# Patient Record
Sex: MALE | Race: WHITE | NOT HISPANIC OR LATINO | Employment: FULL TIME | URBAN - METROPOLITAN AREA
[De-identification: names, ages, dates, MRNs, and addresses within clinical notes are randomized per-mention and may not be internally consistent; named-entity substitution may affect disease eponyms.]

---

## 2024-03-20 ENCOUNTER — OFFICE VISIT (OUTPATIENT)
Dept: ENDOCRINOLOGY | Facility: CLINIC | Age: 54
End: 2024-03-20
Payer: COMMERCIAL

## 2024-03-20 ENCOUNTER — DOCUMENTATION (OUTPATIENT)
Dept: ENDOCRINOLOGY | Facility: CLINIC | Age: 54
End: 2024-03-20

## 2024-03-20 VITALS
DIASTOLIC BLOOD PRESSURE: 84 MMHG | BODY MASS INDEX: 29.52 KG/M2 | OXYGEN SATURATION: 96 % | HEART RATE: 75 BPM | HEIGHT: 75 IN | SYSTOLIC BLOOD PRESSURE: 128 MMHG | WEIGHT: 237.4 LBS

## 2024-03-20 DIAGNOSIS — E11.65 TYPE 2 DIABETES MELLITUS WITH HYPERGLYCEMIA, WITHOUT LONG-TERM CURRENT USE OF INSULIN: Primary | ICD-10-CM

## 2024-03-20 DIAGNOSIS — E78.2 MIXED HYPERLIPIDEMIA: ICD-10-CM

## 2024-03-20 LAB
EXPIRATION DATE: ABNORMAL
EXPIRATION DATE: ABNORMAL
GLUCOSE BLDC GLUCOMTR-MCNC: 158 MG/DL (ref 70–130)
HBA1C MFR BLD: 7 % (ref 4.5–5.7)
Lab: ABNORMAL
Lab: ABNORMAL

## 2024-03-20 PROCEDURE — 82947 ASSAY GLUCOSE BLOOD QUANT: CPT | Performed by: INTERNAL MEDICINE

## 2024-03-20 PROCEDURE — 99204 OFFICE O/P NEW MOD 45 MIN: CPT | Performed by: INTERNAL MEDICINE

## 2024-03-20 RX ORDER — METFORMIN HYDROCHLORIDE 500 MG/1
2 TABLET, EXTENDED RELEASE ORAL 2 TIMES DAILY WITH MEALS
COMMUNITY

## 2024-03-20 RX ORDER — ERGOCALCIFEROL 1.25 MG/1
CAPSULE ORAL
COMMUNITY
Start: 2024-02-25

## 2024-03-20 RX ORDER — EZETIMIBE 10 MG/1
1 TABLET ORAL DAILY
COMMUNITY

## 2024-03-20 RX ORDER — ALLOPURINOL 100 MG/1
TABLET ORAL
COMMUNITY
End: 2024-03-20 | Stop reason: SDUPTHER

## 2024-03-20 RX ORDER — ALLOPURINOL 300 MG/1
1 TABLET ORAL DAILY
COMMUNITY

## 2024-03-20 NOTE — PROGRESS NOTES
"     Office Note      Date: 2024  Patient Name: Tyler Wiseman  MRN: [id number]  : 1970    Chief Complaint   Patient presents with    Diabetes       History of Present Illness:   Tyler Wiseman is a 53 y.o. male who presents for Diabetes type 2. Diagnosed in: . Treated in past with oral agents. He had some foot pain with jardiance.  He was on januvia in the past.  He was on ozempic and tolerated it okay but stopped it over concerns about side effects.  Current treatments: metformin. Number of insulin shots per day: none. Checks blood sugar 2 times a day. Has low blood sugar: no. Aspirin use: No -   . Statin use: No - on ezetimibe . ACE-I/ARB use: No -   .  Last eye exam: summer 2023.    Last A1c was 7.3%.  He had some minimal DM education in the past.    Subjective      Diabetic Complications:  Eyes: No  Kidneys: No  Feet: No  Heart: No    Diet and Exercise:  Meals per day: 3  Minutes of exercise per week: 0 mins.    Review of Systems:   Review of Systems   Constitutional:  Positive for fatigue.   HENT: Negative.     Eyes: Negative.    Cardiovascular: Negative.    Gastrointestinal: Negative.    Endocrine: Negative.    Genitourinary:  Positive for urgency.   Musculoskeletal: Negative.        The following portions of the patient's history were reviewed and updated as appropriate: allergies, current medications, past family history, past medical history, past social history, past surgical history, and problem list.    Objective     Visit Vitals  /84   Pulse 75   Ht 190.5 cm (75\")   Wt 108 kg (237 lb 6.4 oz)   SpO2 96%   BMI 29.67 kg/m²       Physical Exam:  Physical Exam  Constitutional:       Appearance: Normal appearance.   HENT:      Head: Normocephalic and atraumatic.   Eyes:      Extraocular Movements: Extraocular movements intact.      Conjunctiva/sclera: Conjunctivae normal.   Neck:      Thyroid: No thyroid mass, thyromegaly or thyroid tenderness.   Cardiovascular:      Rate " and Rhythm: Normal rate and regular rhythm.      Pulses: Normal pulses.           Dorsalis pedis pulses are 2+ on the right side and 2+ on the left side.        Posterior tibial pulses are 2+ on the right side and 2+ on the left side.      Heart sounds: Normal heart sounds.   Pulmonary:      Effort: Pulmonary effort is normal.      Breath sounds: Normal breath sounds.   Abdominal:      General: Bowel sounds are normal.      Palpations: Abdomen is soft.   Musculoskeletal:         General: Normal range of motion.      Cervical back: Normal range of motion and neck supple.   Feet:      Right foot:      Protective Sensation: 5 sites tested.  5 sites sensed.      Skin integrity: Skin integrity normal.      Toenail Condition: Right toenails are normal.      Left foot:      Protective Sensation: 5 sites tested.  5 sites sensed.      Skin integrity: Skin integrity normal.      Toenail Condition: Left toenails are normal.   Lymphadenopathy:      Cervical: No cervical adenopathy.   Skin:     General: Skin is warm and dry.   Neurological:      General: No focal deficit present.      Mental Status: He is alert.   Psychiatric:         Mood and Affect: Mood normal.         Behavior: Behavior normal.         Thought Content: Thought content normal.         Judgment: Judgment normal.         Labs:    HbA1c  Hemoglobin A1C   Date Value Ref Range Status   03/20/2024 7.0 (A) 4.5 - 5.7 % Final   .    CMP  Lab Results   Component Value Date    GLUCOSE 201 (H) 07/05/2016    BUN 18 07/05/2016    CREATININE 1.00 07/05/2016    EGFRIFNONA 80 07/05/2016    BCR 18.0 07/05/2016    K 4.2 07/05/2016    CO2 26.0 07/05/2016    CALCIUM 9.2 07/05/2016    AST 28 07/05/2016    ALT 37 07/05/2016        Lipid Panel  Lab Results   Component Value Date    HDL 30 (L) 07/05/2016     07/05/2016    TRIG 393 (H) 07/05/2016        TSH  Lab Results   Component Value Date    TSH 1.867 07/05/2016        Hemoglobin A1C  Lab Results   Component Value Date     "HGBA1C 7.0 (A) 03/20/2024        Microalbumin/Creatinine  No results found for: \"MALBCRERATI\"        Assessment / Plan      Assessment & Plan:  Diagnoses and all orders for this visit:    1. Type 2 diabetes mellitus with hyperglycemia, without long-term current use of insulin (Primary)  Assessment & Plan:  Diabetes is improving with treatment.   Continue current treatment regimen.  Work on diet/exercise.  Diabetes will be reassessed in 3 months.    Orders:  -     POC Glycosylated Hemoglobin (Hb A1C)  -     POC Glucose, Blood    2. Mixed hyperlipidemia  Assessment & Plan:  On ezetimibe.  Recent lipids showed elevated trigs.         Current Outpatient Medications   Medication Instructions    allopurinol (ZYLOPRIM) 300 MG tablet 1 tablet, Oral, Daily    clotrimazole-betamethasone (LOTRISONE) 1-0.05 % cream Topical, 2 Times Daily    ezetimibe (ZETIA) 10 MG tablet 1 tablet, Oral, Daily    metFORMIN ER (GLUCOPHAGE-XR) 500 MG 24 hr tablet 2 tablets, Oral, 2 Times Daily With Meals    montelukast (SINGULAIR) 10 mg, Oral, Nightly    vitamin D (ERGOCALCIFEROL) 1.25 MG (08167 UT) capsule capsule       Return in about 3 months (around 6/20/2024) for Recheck with A1c.    Electronically signed by: Iban Morgan MD  03/20/2024  "

## 2024-03-20 NOTE — PROGRESS NOTES
Pt seen for DM nutrition education, addressed questions from pt and spouse re diet. Reviewed healthy food choices, carb counting, and provided sample menus. Encouraged pt to call with additional questions.

## 2024-03-20 NOTE — ASSESSMENT & PLAN NOTE
Diabetes is improving with treatment.   Continue current treatment regimen.  Work on diet/exercise.  Diabetes will be reassessed in 3 months.

## 2024-05-08 ENCOUNTER — TRANSCRIBE ORDERS (OUTPATIENT)
Age: 54
End: 2024-05-08
Payer: COMMERCIAL

## 2024-05-08 DIAGNOSIS — M10.09 IDIOPATHIC GOUT OF MULTIPLE SITES, UNSPECIFIED CHRONICITY: Primary | ICD-10-CM

## 2024-10-04 ENCOUNTER — OFFICE VISIT (OUTPATIENT)
Dept: ENDOCRINOLOGY | Facility: CLINIC | Age: 54
End: 2024-10-04
Payer: COMMERCIAL

## 2024-10-04 VITALS
SYSTOLIC BLOOD PRESSURE: 136 MMHG | HEIGHT: 75 IN | HEART RATE: 63 BPM | DIASTOLIC BLOOD PRESSURE: 88 MMHG | OXYGEN SATURATION: 98 % | BODY MASS INDEX: 30.21 KG/M2 | WEIGHT: 243 LBS

## 2024-10-04 DIAGNOSIS — E78.2 MIXED HYPERLIPIDEMIA: ICD-10-CM

## 2024-10-04 DIAGNOSIS — E11.65 TYPE 2 DIABETES MELLITUS WITH HYPERGLYCEMIA, WITHOUT LONG-TERM CURRENT USE OF INSULIN: Primary | ICD-10-CM

## 2024-10-04 LAB
EXPIRATION DATE: ABNORMAL
EXPIRATION DATE: NORMAL
GLUCOSE BLDC GLUCOMTR-MCNC: 101 MG/DL (ref 70–130)
HBA1C MFR BLD: 7.4 % (ref 4.5–5.7)
Lab: ABNORMAL
Lab: NORMAL

## 2024-10-04 RX ORDER — ASPIRIN 81 MG/1
81 TABLET ORAL DAILY
COMMUNITY

## 2024-10-04 NOTE — PROGRESS NOTES
"     Office Note      Date: 10/04/2024  Patient Name: Tyler Wiseman  MRN: [id number]  : 1970    Chief Complaint   Patient presents with    Diabetes     Type 2 diabetes mellitus with hyperglycemia, without long-term current use of insulin       History of Present Illness:   Tyler Wiseman is a 53 y.o. male who presents for Diabetes type 2. Diagnosed in: . Treated in past with oral agents. He had some foot pain with jardiance.  He was on januvia in the past.  He was on ozempic and tolerated it okay but stopped it over concerns about side effects.  Current treatments: metformin. Number of insulin shots per day: none. Checks blood sugar 2 times a day. Has low blood sugar: no. Aspirin use: Yes. Statin use: No - on ezetimibe . ACE-I/ARB use: No.  Change in health since last visit: none.  Last eye exam: summer 2023.     Subjective      Diabetic Complications:  Eyes: No  Kidneys: No  Feet: No  Heart: No    Diet and Exercise:  Meals per day: 3  Minutes of exercise per week: 0 mins.    Review of Systems:   Review of Systems   Constitutional:  Positive for fatigue.   Cardiovascular: Negative.    Gastrointestinal: Negative.    Endocrine: Negative.        The following portions of the patient's history were reviewed and updated as appropriate: allergies, current medications, past family history, past medical history, past social history, past surgical history, and problem list.    Objective     Visit Vitals  /88 (BP Location: Right arm, Patient Position: Sitting, Cuff Size: Adult)   Pulse 63   Ht 190.5 cm (75\")   Wt 110 kg (243 lb)   SpO2 98%   BMI 30.37 kg/m²       Physical Exam:  Physical Exam  Constitutional:       Appearance: Normal appearance.   Neurological:      Mental Status: He is alert.         Labs:    HbA1c  Lab Results   Component Value Date    HGBA1C 7.4 (A) 10/04/2024       CMP  Lab Results   Component Value Date    GLUCOSE 201 (H) 2016    BUN 18 2016    CREATININE 1.00 " "07/05/2016    EGFRIFNONA 80 07/05/2016    BCR 18.0 07/05/2016    K 4.2 07/05/2016    CO2 26.0 07/05/2016    CALCIUM 9.2 07/05/2016    AST 28 07/05/2016    ALT 37 07/05/2016        Lipid Panel  Lab Results   Component Value Date    HDL 30 (L) 07/05/2016     07/05/2016    TRIG 393 (H) 07/05/2016        TSH  Lab Results   Component Value Date    TSH 1.867 07/05/2016        Hemoglobin A1C  Lab Results   Component Value Date    HGBA1C 7.4 (A) 10/04/2024        Microalbumin/Creatinine  No results found for: \"MALBCRERATIO\", \"CREATINIURIN\", \"MICROALBUR\"        Assessment / Plan      Assessment & Plan:  Diagnoses and all orders for this visit:    1. Type 2 diabetes mellitus with hyperglycemia, without long-term current use of insulin (Primary)  Assessment & Plan:  Diabetes is worsening.   We discussed treatment options.   He would like to work on diet/exercise before adding another medication.  Diabetes will be reassessed in 6 months.    Orders:  -     POC Glycosylated Hemoglobin (Hb A1C)  -     POC Glucose, Blood    2. Mixed hyperlipidemia  Assessment & Plan:  Continue ezetimibe.  Plan to check lipids next visit.        Current Outpatient Medications   Medication Instructions    allopurinol (ZYLOPRIM) 300 MG tablet 1 tablet, Oral, Daily    aspirin 81 mg, Oral, Daily    clotrimazole-betamethasone (LOTRISONE) 1-0.05 % cream Topical, 2 Times Daily    ezetimibe (ZETIA) 10 MG tablet 1 tablet, Oral, Daily    metFORMIN ER (GLUCOPHAGE-XR) 500 MG 24 hr tablet 2 tablets, Oral, 2 Times Daily With Meals    montelukast (SINGULAIR) 10 mg, Oral, Nightly    vitamin D (ERGOCALCIFEROL) 1.25 MG (21221 UT) capsule capsule       Return in about 6 months (around 4/4/2025) for Recheck with A1c, CMP, lipid, TSH, microalbumin, foot exam.    Electronically signed by: Iban Morgan MD  10/04/2024  "

## 2024-10-04 NOTE — ASSESSMENT & PLAN NOTE
Diabetes is worsening.   We discussed treatment options.   He would like to work on diet/exercise before adding another medication.  Diabetes will be reassessed in 6 months.

## 2025-05-02 ENCOUNTER — TELEPHONE (OUTPATIENT)
Dept: ENDOCRINOLOGY | Facility: CLINIC | Age: 55
End: 2025-05-02

## 2025-05-02 NOTE — TELEPHONE ENCOUNTER
Caller: Tyler Wiseman    Relationship to patient: Self    Best call back number: [phone]     Patient is needing: PT REQUESTS VISIT SCHEDULED 8/5/25 BE FULFILLED VIA [facility] DUE TO POSITIVE COVID TEST. PLEASE CALL TO ADVISE.

## 2025-05-02 NOTE — TELEPHONE ENCOUNTER
Called pt to let him know Dr Morgan does not offer mychart video visit appt has been rescheduled to 05/22/25 at 10:30

## 2025-05-22 ENCOUNTER — OFFICE VISIT (OUTPATIENT)
Dept: ENDOCRINOLOGY | Facility: CLINIC | Age: 55
End: 2025-05-22
Payer: COMMERCIAL

## 2025-05-22 VITALS
OXYGEN SATURATION: 96 % | HEIGHT: 75 IN | HEART RATE: 64 BPM | DIASTOLIC BLOOD PRESSURE: 88 MMHG | WEIGHT: 247 LBS | BODY MASS INDEX: 30.71 KG/M2 | SYSTOLIC BLOOD PRESSURE: 126 MMHG

## 2025-05-22 DIAGNOSIS — E11.65 TYPE 2 DIABETES MELLITUS WITH HYPERGLYCEMIA, WITHOUT LONG-TERM CURRENT USE OF INSULIN: Primary | ICD-10-CM

## 2025-05-22 DIAGNOSIS — E78.2 MIXED HYPERLIPIDEMIA: ICD-10-CM

## 2025-05-22 LAB
ALBUMIN SERPL-MCNC: 4.5 G/DL (ref 3.5–5.2)
ALBUMIN UR-MCNC: <1.2 MG/DL
ALBUMIN/GLOB SERPL: 1.7 G/DL
ALP SERPL-CCNC: 95 U/L (ref 39–117)
ALT SERPL W P-5'-P-CCNC: 30 U/L (ref 1–41)
ANION GAP SERPL CALCULATED.3IONS-SCNC: 12.3 MMOL/L (ref 5–15)
AST SERPL-CCNC: 19 U/L (ref 1–40)
BILIRUB SERPL-MCNC: 0.3 MG/DL (ref 0–1.2)
BUN SERPL-MCNC: 17 MG/DL (ref 6–20)
BUN/CREAT SERPL: 15 (ref 7–25)
CALCIUM SPEC-SCNC: 9.9 MG/DL (ref 8.6–10.5)
CHLORIDE SERPL-SCNC: 104 MMOL/L (ref 98–107)
CHOLEST SERPL-MCNC: 174 MG/DL (ref 0–200)
CO2 SERPL-SCNC: 24.7 MMOL/L (ref 22–29)
CREAT SERPL-MCNC: 1.13 MG/DL (ref 0.76–1.27)
CREAT UR-MCNC: 26.4 MG/DL
EGFRCR SERPLBLD CKD-EPI 2021: 76.3 ML/MIN/1.73
EXPIRATION DATE: ABNORMAL
EXPIRATION DATE: ABNORMAL
GLOBULIN UR ELPH-MCNC: 2.7 GM/DL
GLUCOSE BLDC GLUCOMTR-MCNC: 199 MG/DL (ref 70–130)
GLUCOSE SERPL-MCNC: 187 MG/DL (ref 65–99)
HBA1C MFR BLD: 8.3 % (ref 4.5–5.7)
HDLC SERPL-MCNC: 30 MG/DL (ref 40–60)
LDLC SERPL CALC-MCNC: 91 MG/DL (ref 0–100)
LDLC/HDLC SERPL: 2.66 {RATIO}
Lab: ABNORMAL
Lab: ABNORMAL
MICROALBUMIN/CREAT UR: NORMAL MG/G{CREAT}
POTASSIUM SERPL-SCNC: 4.2 MMOL/L (ref 3.5–5.2)
PROT SERPL-MCNC: 7.2 G/DL (ref 6–8.5)
SODIUM SERPL-SCNC: 141 MMOL/L (ref 136–145)
TRIGL SERPL-MCNC: 321 MG/DL (ref 0–150)
TSH SERPL DL<=0.05 MIU/L-ACNC: 2.05 UIU/ML (ref 0.27–4.2)
VLDLC SERPL-MCNC: 53 MG/DL (ref 5–40)

## 2025-05-22 PROCEDURE — 80061 LIPID PANEL: CPT | Performed by: INTERNAL MEDICINE

## 2025-05-22 PROCEDURE — 84443 ASSAY THYROID STIM HORMONE: CPT | Performed by: INTERNAL MEDICINE

## 2025-05-22 PROCEDURE — 82570 ASSAY OF URINE CREATININE: CPT | Performed by: INTERNAL MEDICINE

## 2025-05-22 PROCEDURE — 82043 UR ALBUMIN QUANTITATIVE: CPT | Performed by: INTERNAL MEDICINE

## 2025-05-22 PROCEDURE — 80053 COMPREHEN METABOLIC PANEL: CPT | Performed by: INTERNAL MEDICINE

## 2025-05-22 NOTE — PROGRESS NOTES
"     Office Note      Date: 2025  Patient Name: Tyler Wiseman  MRN: [id number]  : 1970    Chief Complaint   Patient presents with    Diabetes     Type II Diabetes Mellitus with Hyperglycemia, without Long-Term Current Use of Insulin    Hyperlipidemia     Mixed       History of Present Illness:   Tyler Wiseman is a 53 y.o. male who presents for Diabetes type 2. Diagnosed in: 2017. Treated in past with oral agents. He had some foot pain with jardiance.  He was on januvia in the past.  He was on ozempic and tolerated it okay but stopped it over concerns about side effects.  Current treatments: metformin. Number of insulin shots per day: none. Checks blood sugar 2 times a day. Has low blood sugar: no. Aspirin use: Yes. Statin use: No. ACE-I/ARB use: No.  Change in health since last visit: none.  Last eye exam: summer 2024.     Subjective      Diabetic Complications:  Eyes: No  Kidneys: No  Feet: No  Heart: No    Diet and Exercise:  Meals per day: 3  Minutes of exercise per week: 0 mins.    Review of Systems:   Review of Systems   Constitutional:  Positive for fatigue.   Cardiovascular: Negative.    Gastrointestinal: Negative.    Endocrine: Negative.        The following portions of the patient's history were reviewed and updated as appropriate: allergies, current medications, past family history, past medical history, past social history, past surgical history, and problem list.    Objective     Visit Vitals  /88 (BP Location: Left arm, Patient Position: Sitting, Cuff Size: Adult)   Pulse 64   Ht 190.5 cm (75\")   Wt 112 kg (247 lb)   SpO2 96%   BMI 30.87 kg/m²       Physical Exam:  Physical Exam  Constitutional:       Appearance: Normal appearance.   Cardiovascular:      Pulses:           Dorsalis pedis pulses are 2+ on the right side and 2+ on the left side.        Posterior tibial pulses are 2+ on the right side and 2+ on the left side.   Feet:      Right foot:      Protective " "Sensation: 5 sites tested.  5 sites sensed.      Skin integrity: Skin integrity normal.      Toenail Condition: Right toenails are normal.      Left foot:      Protective Sensation: 5 sites tested.  5 sites sensed.      Skin integrity: Skin integrity normal.      Toenail Condition: Left toenails are normal.   Neurological:      Mental Status: He is alert.         Labs:    HbA1c  Lab Results   Component Value Date    HGBA1C 8.3 (A) 05/22/2025       CMP  Lab Results   Component Value Date    GLUCOSE 201 (H) 07/05/2016    BUN 18 07/05/2016    CREATININE 1.00 07/05/2016    EGFRIFNONA 80 07/05/2016    BCR 18.0 07/05/2016    K 4.2 07/05/2016    CO2 26.0 07/05/2016    CALCIUM 9.2 07/05/2016    AST 28 07/05/2016    ALT 37 07/05/2016        Lipid Panel  Lab Results   Component Value Date    HDL Cholesterol 30 (L) 07/05/2016    LDL Cholesterol  101 07/05/2016    Triglycerides 393 (H) 07/05/2016        TSH  Lab Results   Component Value Date    TSH 1.867 07/05/2016        Hemoglobin A1C  No components found for: \"HGBA1C\"     Microalbumin/Creatinine  No results found for: \"MALBCRERATIO\", \"CREATINIURIN\", \"MICROALBUR\"        Assessment / Plan      Assessment & Plan:  Diagnoses and all orders for this visit:    1. Type 2 diabetes mellitus with hyperglycemia, without long-term current use of insulin (Primary)  Assessment & Plan:  Diabetes is worsening.   We discussed treatment options today.  He hasn't tolerated multiple meds in the past.   Will try januvia again since it tends to have fewer side effects.  Diabetes will be reassessed in 6 months.    Orders:  -     POC Glucose, Blood  -     POC Glycosylated Hemoglobin (Hb A1C)  -     Comprehensive Metabolic Panel; Future  -     Lipid Panel; Future  -     Microalbumin / Creatinine Urine Ratio - Urine, Clean Catch; Future  -     TSH; Future    2. Mixed hyperlipidemia  Assessment & Plan:  No longer on ezetimibe.  Check lipids today.      Other orders  -     SITagliptin (Januvia) 100 MG " tablet; Take 1 tablet by mouth Daily.  Dispense: 90 tablet; Refill: 3      Current Outpatient Medications   Medication Instructions    aspirin 81 mg, Daily    metFORMIN ER (GLUCOPHAGE-XR) 500 MG 24 hr tablet 2 tablets, 2 Times Daily With Meals    SITagliptin (JANUVIA) 100 mg, Oral, Daily    vitamin D (ERGOCALCIFEROL) 1.25 MG (24360 UT) capsule capsule       Return in about 6 months (around 11/22/2025) for Recheck with A1c.    Electronically signed by: Iban Morgan MD  05/22/2025

## 2025-05-22 NOTE — ASSESSMENT & PLAN NOTE
Diabetes is worsening.   We discussed treatment options today.  He hasn't tolerated multiple meds in the past.   Will try januvia again since it tends to have fewer side effects.  Diabetes will be reassessed in 6 months.

## 2025-05-23 ENCOUNTER — RESULTS FOLLOW-UP (OUTPATIENT)
Dept: ENDOCRINOLOGY | Facility: CLINIC | Age: 55
End: 2025-05-23
Payer: COMMERCIAL